# Patient Record
Sex: FEMALE | Race: WHITE | NOT HISPANIC OR LATINO | Employment: FULL TIME | URBAN - NONMETROPOLITAN AREA
[De-identification: names, ages, dates, MRNs, and addresses within clinical notes are randomized per-mention and may not be internally consistent; named-entity substitution may affect disease eponyms.]

---
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
## 2022-12-14 ENCOUNTER — OFFICE VISIT (OUTPATIENT)
Dept: URGENT CARE | Facility: CLINIC | Age: 22
End: 2022-12-14

## 2022-12-14 VITALS
SYSTOLIC BLOOD PRESSURE: 136 MMHG | WEIGHT: 241 LBS | HEIGHT: 64 IN | TEMPERATURE: 100.9 F | BODY MASS INDEX: 41.15 KG/M2 | OXYGEN SATURATION: 96 % | DIASTOLIC BLOOD PRESSURE: 78 MMHG | RESPIRATION RATE: 18 BRPM | HEART RATE: 96 BPM

## 2022-12-14 DIAGNOSIS — R68.89 FLU-LIKE SYMPTOMS: Primary | ICD-10-CM

## 2022-12-14 RX ORDER — NAPROXEN 500 MG/1
500 TABLET ORAL 2 TIMES DAILY WITH MEALS
COMMUNITY

## 2022-12-14 RX ORDER — HYDROXYZINE HYDROCHLORIDE 25 MG/1
25 TABLET, FILM COATED ORAL DAILY PRN
COMMUNITY

## 2022-12-14 RX ORDER — ALBUTEROL SULFATE 90 UG/1
2 AEROSOL, METERED RESPIRATORY (INHALATION)
COMMUNITY

## 2022-12-14 RX ORDER — BENZONATATE 200 MG/1
200 CAPSULE ORAL 3 TIMES DAILY PRN
Qty: 20 CAPSULE | Refills: 0 | Status: SHIPPED | OUTPATIENT
Start: 2022-12-14

## 2022-12-14 NOTE — PROGRESS NOTES
[facility]        NAME: Joseph Gonzales is a 23 y o  female  : 2000    MRN: [id number]  DATE: 2022  TIME: 11:15 AM    Assessment and Plan   Flu-like symptoms [R68 89]  1  Flu-like symptoms  Covid19 and INFLUENZA A/B PCR    benzonatate (TESSALON) 200 MG capsule        Jasen problem with patient  COVID/flu PCR was ordered and sent out and can find results on [employer]  Prescribing Tessalon Perles for cough symptoms  Advised to continue over-the-counter cold and flu medication as needed for symptoms  Advised that she should push fluids and should be drinking at least 3 bottles of water a day  She report to ER if symptoms worsen  Patient Instructions       Follow up with PCP in 3-5 days  Proceed to  ER if symptoms worsen  Chief Complaint     Chief Complaint   Patient presents with   • Cough     Fever, cough, fatigue and back pain with joint aches x 3 days         History of Present Illness       82-year-old female presents with sudden onset of fevers, fatigue, body aches  Symptoms have been going on for the last 3 days  Did not receive influenza vaccination this year  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 and is seeing a lot of patients with similar symptoms  Patient states she is nauseous while eating and has had 3 episodes of vomiting  Also complains of body aches and headaches  Has managing symptoms with over-the-counter cold and flu medication which somewhat helps  Denies any shortness of breath, chest pain, diarrhea  Cough  Associated symptoms include a fever, headaches, myalgias, postnasal drip, rhinorrhea and a sore throat  Pertinent negatives include no chest pain, chills, shortness of breath or wheezing  Review of Systems   Review of Systems   Constitutional: Positive for appetite change, fatigue and fever  Negative for chills  HENT: Positive for congestion, postnasal drip, rhinorrhea and sore throat  Respiratory: Positive for cough  Negative for shortness of breath, wheezing and stridor  Cardiovascular: Negative for chest pain and palpitations  Gastrointestinal: Positive for nausea and vomiting  Negative for abdominal pain, constipation and diarrhea  Musculoskeletal: Positive for myalgias  Neurological: Positive for headaches  Negative for dizziness, syncope and numbness  Current Medications       Current Outpatient Medications:   •  albuterol (PROVENTIL HFA,VENTOLIN HFA) 90 mcg/act inhaler, Inhale 2 puffs, Disp: , Rfl:   •  benzonatate (TESSALON) 200 MG capsule, Take 1 capsule (200 mg total) by mouth 3 (three) times a day as needed for cough, Disp: 20 capsule, Rfl: 0  •  hydrOXYzine HCL (ATARAX) 25 mg tablet, Take 25 mg by mouth daily as needed for itching, Disp: , Rfl:   •  naproxen (NAPROSYN) 500 mg tablet, Take 500 mg by mouth 2 (two) times a day with meals, Disp: , Rfl:     Current Allergies     Allergies as of 12/14/2022   • (No Known Allergies)            The following portions of the patient's history were reviewed and updated as appropriate: allergies, current medications, past family history, past medical history, past social history, past surgical history and problem list      Past Medical History:   Diagnosis Date   • Anxiety    • Asthma        Past Surgical History:   Procedure Laterality Date   • NO PAST SURGERIES         Family History   Problem Relation Age of Onset   • Thyroid disease Mother    • Hypertension Father          Medications have been verified  Objective   /78   Pulse 96   Temp (!) 100 9 °F (38 3 °C)   Resp 18   Ht 5' 4" (1 626 m)   Wt 109 kg (241 lb)   LMP 11/22/2022   SpO2 96%   BMI 41 37 kg/m²        Physical Exam     Physical Exam  Vitals and nursing note reviewed  Constitutional:       General: She is not in acute distress  Appearance: Normal appearance  She is normal weight  She is not ill-appearing, toxic-appearing or diaphoretic  HENT:      Head: Normocephalic  Right Ear: Tympanic membrane, ear canal and external ear normal  There is no impacted cerumen  Left Ear: Tympanic membrane, ear canal and external ear normal  There is no impacted cerumen  Nose: Congestion and rhinorrhea present  Mouth/Throat:      Mouth: Mucous membranes are moist       Pharynx: Oropharynx is clear  Posterior oropharyngeal erythema present  No oropharyngeal exudate  Eyes:      General:         Right eye: No discharge  Left eye: No discharge  Extraocular Movements: Extraocular movements intact  Conjunctiva/sclera: Conjunctivae normal       Pupils: Pupils are equal, round, and reactive to light  Neck:      Vascular: No carotid bruit  Cardiovascular:      Rate and Rhythm: Normal rate and regular rhythm  Pulses: Normal pulses  Heart sounds: Normal heart sounds  No murmur heard  No friction rub  No gallop  Pulmonary:      Effort: Pulmonary effort is normal  No respiratory distress  Breath sounds: Normal breath sounds  No stridor  No wheezing, rhonchi or rales  Chest:      Chest wall: No tenderness  Abdominal:      General: Abdomen is flat  Bowel sounds are normal  There is no distension  Palpations: Abdomen is soft  There is no mass  Tenderness: There is no abdominal tenderness  There is no right CVA tenderness, left CVA tenderness, guarding or rebound  Hernia: No hernia is present  Musculoskeletal:         General: No swelling, tenderness or signs of injury  Normal range of motion  Cervical back: Normal range of motion and neck supple  No rigidity or tenderness  Lymphadenopathy:      Cervical: No cervical adenopathy  Skin:     General: Skin is warm and dry  Capillary Refill: Capillary refill takes less than 2 seconds  Coloration: Skin is not jaundiced or pale  Findings: No erythema  Neurological:      General: No focal deficit present        Mental Status: She is alert and oriented to person, place, and time     Psychiatric:         Mood and Affect: Mood normal          Behavior: Behavior normal

## 2022-12-14 NOTE — LETTER
December 14, 2022     Patient: Jermain Li   YOB: 2000   Date of Visit: 12/14/2022       To Whom it May Concern:    Karly Ashton was seen in my clinic on 12/14/2022  She may return to work on 12/18  If you have any questions or concerns, please don't hesitate to call           Sincerely,          Susan Mallory PA-C        CC: No Recipients

## 2022-12-14 NOTE — PATIENT INSTRUCTIONS
Fever/Body Aches: OTC Tylenol, ibuprofen, motrin as directed  Cough: OTC Robitussin or Delsym cough syrup  Sore Throat: Warm saltwater gargles, honey, drink plenty of liquids, soft foods  If severe, can utilize OTC chloraseptic spray  Nasal Congestion: OTC saline nasal spray use as directed or OTC flonase, OTC decongestants such as Sudafed as long as no history of high blood pressure   Cool mist humidifier  Vitamin C and Zinc for immune support

## 2022-12-15 LAB
FLUAV RNA RESP QL NAA+PROBE: NEGATIVE
FLUBV RNA RESP QL NAA+PROBE: NEGATIVE
SARS-COV-2 RNA RESP QL NAA+PROBE: POSITIVE

## 2022-12-20 ENCOUNTER — TELEPHONE (OUTPATIENT)
Dept: URGENT CARE | Facility: CLINIC | Age: 22
End: 2022-12-20

## 2022-12-20 ENCOUNTER — DOCUMENTATION (OUTPATIENT)
Dept: URGENT CARE | Facility: CLINIC | Age: 22
End: 2022-12-20

## 2023-02-20 ENCOUNTER — TELEPHONE (OUTPATIENT)
Facility: HOSPITAL | Age: 23
End: 2023-02-20

## 2023-02-20 NOTE — TELEPHONE ENCOUNTER
Called PT on 2/20 regarding a left voice message at the [facility] office  PT was asking if her referral had come through to us yet, and when she could be scheduled  I spoke with PT and let her know that we did receive her referral and since it is a preconception visit, we would be having one of our providers look over her chart and let us know when she needs to be scheduled  I told PT we would call her as soon as we hear from a provider regarding this

## 2023-02-21 ENCOUNTER — TELEPHONE (OUTPATIENT)
Facility: HOSPITAL | Age: 23
End: 2023-02-21

## 2023-02-21 NOTE — TELEPHONE ENCOUNTER
Called PT on 2/21 regarding scheduling a preconception visit per [place]  PT answered and requested we call her back in the afternoon today

## 2023-03-28 ENCOUNTER — CONSULT (OUTPATIENT)
Dept: PERINATAL CARE | Facility: CLINIC | Age: 23
End: 2023-03-28

## 2023-03-28 VITALS — DIASTOLIC BLOOD PRESSURE: 78 MMHG | SYSTOLIC BLOOD PRESSURE: 120 MMHG

## 2023-03-28 DIAGNOSIS — Z31.69 ENCOUNTER FOR PRECONCEPTION CONSULTATION: Primary | ICD-10-CM

## 2023-03-28 DIAGNOSIS — E66.01 MORBID OBESITY WITH BODY MASS INDEX (BMI) OF 40.0 TO 44.9 IN ADULT (HCC): ICD-10-CM

## 2023-03-28 NOTE — PROGRESS NOTES
Scott Scheuermann presents today for a preconception consultation secondary to elevated BMI  She has a history of asthma and anxiety for which she takes albuterol and hydroxyzine as needed  She has wisdom teeth extraction  She has no known drug allergies  Substance use history and family medical history are noncontributory other than maternal history of hypothyroidism and paternal history of hypertension  The patient has a current BMI of 41 3  We reviewed her chronic medical problems including elevted BMI  We reviewed her medications and discussed any potential teratogens  Her medications include Albuterol, hydroxyzine, and naprosyn  Current medications are not teratogenic  Her repoductive history was reviewed and was notable for heavy periods and cramps  Genetic conditions and family history was reviewed and is father with HTN and mother with hypothroidism  Substance use history was reviewed and is negative  Infectious diseases and vaccinations were discussed and are notable for up to date  Nutrition, exercise, and weight management were reviewed  I discussed the safety of exercise and encourage at least 150 min per week in accordance with national guidelines  Environmental hazards and toxins were discussed  Social and mental health concerns including depression, anxiety, social support, intimate partner violence, housing, and food security were addressed  Physical exam was notable for elevated BMI  We discussed the importance of folic acid supplementation in all patients of reproductive potential  I recommend a prenatal vitamin or at least 758KHM of folic acid daily  The implications of obesity and pregnancy are significant    The level of obesity is directly related to the risk of adverse pregnancy outcomes including but not limited to, risk of diabetes, hypertensive disorders of pregnancy, macrosomia, intrauterine growth restriction, labor and shoulder dystocias,  section, and increased risk of stillbirth  Recommend discussing the current weight gain recommendations for women with obesity and discussing good dietary practices as well as the safety of exercise in pregnancy  I recommend the patient gain no more than 11-20 pounds throughout her entire pregnancy, increase her exercise and follow healthy dietary habits  Consider referral to a dietitian should the patient have difficulty following the aforementioned recommendations  We discussed that the ideal time to lose weight is prior to pregnancy  We discussed additional monitoring related to elevated BMI  Recommend third trimester growth ultrasounds to screen for fetal growth problems as well as ensuring the patient is appropriately screened for pregestational and gestational diabetes  Additional  surveillance is recommended starting at 36 weeks in those women with a BMI of greater than 40 given the increased risk for stillbirth  Thank you very much for allowing us to participate in the care of this very nice patient  Should you have any questions, please do not hesitate to contact our office

## 2023-03-28 NOTE — ASSESSMENT & PLAN NOTE
Sonal Brown presents today for a preconception consultation secondary to elevated BMI  She has a history of asthma and anxiety for which she takes albuterol and hydroxyzine as needed  She has wisdom teeth extraction  She has no known drug allergies  Substance use history and family medical history are noncontributory other than maternal history of hypothyroidism and paternal history of hypertension  The patient has a current BMI of 41 3  We reviewed her chronic medical problems including elevted BMI  We reviewed her medications and discussed any potential teratogens  Her medications include Albuterol, hydroxyzine, and naprosyn  Current medications are not teratogenic  Her repoductive history was reviewed and was notable for heavy periods and cramps  Genetic conditions and family history was reviewed and is father with HTN and mother with hypothroidism  Substance use history was reviewed and is negative  Infectious diseases and vaccinations were discussed and are notable for up to date  Nutrition, exercise, and weight management were reviewed  I discussed the safety of exercise and encourage at least 150 min per week in accordance with national guidelines  Environmental hazards and toxins were discussed  Social and mental health concerns including depression, anxiety, social support, intimate partner violence, housing, and food security were addressed  Physical exam was notable for elevated BMI  We discussed the importance of folic acid supplementation in all patients of reproductive potential  I recommend a prenatal vitamin or at least [id number] of folic acid daily  The implications of obesity and pregnancy are significant    The level of obesity is directly related to the risk of adverse pregnancy outcomes including but not limited to, risk of diabetes, hypertensive disorders of pregnancy, macrosomia, intrauterine growth restriction, labor and shoulder dystocias,  section, and increased risk of stillbirth  Recommend discussing the current weight gain recommendations for women with obesity and discussing good dietary practices as well as the safety of exercise in pregnancy  I recommend the patient gain no more than 11-20 pounds throughout her entire pregnancy, increase her exercise and follow healthy dietary habits  Consider referral to a dietitian should the patient have difficulty following the aforementioned recommendations  We discussed that the ideal time to lose weight is prior to pregnancy  We discussed additional monitoring related to elevated BMI  Recommend third trimester growth ultrasounds to screen for fetal growth problems as well as ensuring the patient is appropriately screened for pregestational and gestational diabetes  Additional  surveillance is recommended starting at 36 weeks in those women with a BMI of greater than 40 given the increased risk for stillbirth  Thank you very much for allowing us to participate in the care of this very nice patient  Should you have any questions, please do not hesitate to contact our office